# Patient Record
Sex: FEMALE | Race: WHITE
[De-identification: names, ages, dates, MRNs, and addresses within clinical notes are randomized per-mention and may not be internally consistent; named-entity substitution may affect disease eponyms.]

---

## 2018-10-15 NOTE — EKG REPORT
SEVERITY:- OTHERWISE NORMAL ECG -

SINUS RHYTHM

LEFT AXIS DEVIATION

:

Confirmed by: Levy Ly MD 15-Oct-2018 13:31:29

## 2018-10-15 NOTE — RADIOLOGY REPORT (SQ)
EXAM DESCRIPTION:  CHEST PA/LATERAL



COMPLETED DATE/TIME:  10/15/2018 8:57 am



REASON FOR STUDY:  PRE-OP



COMPARISON:  None.



EXAM PARAMETERS:  NUMBER OF VIEWS: two views

TECHNIQUE: Digital Frontal and Lateral radiographic views of the chest acquired.

RADIATION DOSE: NA

LIMITATIONS: none



FINDINGS:  LUNGS AND PLEURA: No opacities, masses or pneumothorax. No pleural effusion.

MEDIASTINUM AND HILAR STRUCTURES: No masses or contour abnormalities.

HEART AND VASCULAR STRUCTURES: Heart normal size.  No evidence for failure.

BONES: No acute findings.

HARDWARE: None in the chest.

OTHER: No other significant finding.



IMPRESSION:  1.  NO SIGNIFICANT RADIOGRAPHIC FINDING IN THE CHEST.



TECHNICAL DOCUMENTATION:  JOB ID:  [id number]

 2011 [employer]- All Rights Reserved



Reading location - IP/workstation name: [email or web address]

## 2018-11-12 NOTE — RADIOLOGY REPORT (SQ)
EXAM DESCRIPTION:  KNEE RIGHT 2 VIEWS



COMPLETED DATE/TIME:  11/12/2018 10:44 am



REASON FOR STUDY:  Post OP -Long Cassette in PACU M17.11  UNILATERAL PRIMARY OSTEOARTHRITIS, RIGHT KN
EE



COMPARISON:  None.



NUMBER OF VIEWS:  Two view(s).



TECHNIQUE:  Digital radiographic images of the right knee post-procedure.



LIMITATIONS:  None.



FINDINGS:  BONES: No  worrisome or unexpected findings post-procedure.

DEVICE: Total knee arthroplasty.

SOFT TISSUES:  No worrisome findings.  Expected postoperative soft tissue changes.



IMPRESSION:  1. SATISFACTORY POSTOPERATIVE RIGHT KNEE.



TECHNICAL DOCUMENTATION:  JOB ID:  [id number]

 2011 [employer]- All Rights Reserved



Reading location - IP/workstation name: [email or web address]

## 2018-11-12 NOTE — OPERATIVE REPORT
Operative Report


DATE OF SURGERY: 11/12/18


PREOPERATIVE DIAGNOSIS: Right knee arthritis


OPERATION: Right knee arthroplasty


SURGEON: TUCKER DOWELL


ANESTHESIA: GA


TISSUE REMOVED OR ALTERED: Bone to pathology with multiple loose bodies


ESTIMATED BLOOD LOSS: 100


PROCEDURE: 





Implants used:


Femur: [place] triathlon size 4 CR femur


Tibia: 4 tibia


Tibial liner: 11 mm CS insert


Patella: A 35 mm oval patella





Procedure with the patient supine on the operating table the right the limb is 

prepped and draped in a sterile fashion.  The limb was elevated for 

exsanguination and the tourniquet inflated to 280 torr.  A standard midline 

median parapatellar approach the knee is taken.  Access is gained to the 

femoral canal through the intercondylar notch.  Intramedullary alignment 

instrumentation used to resect 10 mm of distal femur in 5 of valgus.  Sizing 

guide indicated a size 4 femur.  Appropriate cutting jig is then used to 

fashion anterior posterior and chamfer cuts.  A trial reduction femurs 

performed and this is judged to be adequate.





Attention was next turned to the tibia.  Using an extra medullary alignment 

system 9 millimeters was resected off the lateral tibial plateau.  This is 

sized to a size 4 tibia.  A trial reduction was now performed with a 4 femur 

and a for tibia using a 11 millimeters spacer.  It is full extension and 

central patellofemoral tracking.  The articular surface the patella was next 

resected using an oscillating saw.





All trial implants were removed.  Polymethylmethacrylate is mixed and used to 

cement the above implants in place.  On adequate curing the cement excess 

cement was removed the tourniquet was deflated hemostasis obtained the wound is 

then closed in layers using interrupted Vicryl followed by staples.  A sterile 

compressive dressing was applied and the patient returned to recovery room in 

satisfactory condition.

## 2018-11-13 NOTE — PDOC PROGRESS REPORT
Subjective


Progress Note for:: 11/13/18


Reason For Visit: 


M17.11 UNILATERAL PRIMARY OSTEOARTHRITIS, RIGHT KN


72-year-old white female now postop day 1 status post right knee arthroplasty.  

Patient with ongoing complaints of pain and had some wound drainage leading to 

dressing reinforcement last night.





Physical Exam


Vital Signs: 


 











Temp Pulse Resp BP Pulse Ox


 


 37.4 C   72   16   102/51 L  96 


 


 11/12/18 23:00  11/12/18 23:00  11/12/18 23:00  11/12/18 23:00  11/12/18 23:00








 Intake & Output











 11/12/18 11/13/18 11/14/18





 06:59 06:59 06:59


 


Intake Total 666 4850 


 


Output Total  2700 


 


Balance 666 2150 


 


Weight  189.2 kg 











General appearance: PRESENT: mild distress, obese, well-nourished


Respiratory exam: PRESENT: unlabored


Cardiovascular exam: PRESENT: RRR


Pulses: PRESENT: +1 pedal pulses bilateral


Vascular exam: PRESENT: normal capillary refill


GI/Abdominal exam: PRESENT: soft


Rectal exam: PRESENT: deferred


Extremities exam: PRESENT: other - Right knee dressing change.  Wound is well 

approximated with staples.  There is no active drainage.  OpSite dressing is 

replaced.


Neurological exam: PRESENT: alert, awake, oriented to person, oriented to place

, oriented to time, oriented to situation.  ABSENT: motor sensory deficit


Psychiatric exam: PRESENT: appropriate affect, normal mood.  ABSENT: homicidal 

ideation, suicidal ideation


Skin exam: PRESENT: dry, intact, warm.  ABSENT: cyanosis, rash





Results


Laboratory Results: 


 





 11/13/18 06:15 





 11/13/18 06:15 





 











  11/13/18 11/13/18





  06:15 06:15


 


WBC  8.7 


 


RBC  2.45 L 


 


Hgb  8.0 L 


 


Hct  22.8 L 


 


MCV  93 


 


MCH  32.7 


 


MCHC  35.2 


 


RDW  12.9 


 


Plt Count  158 


 


Sodium   132.6 L


 


Potassium   4.0


 


Chloride   95 L


 


Carbon Dioxide   27


 


Anion Gap   11


 


BUN   24 H


 


Creatinine   0.60


 


Est GFR ( Amer)   > 60


 


Est GFR (Non-Af Amer)   > 60


 


Glucose   115 H


 


Calcium   8.1 L











Impressions: 


 





Knee X-Ray  11/12/18 09:57


IMPRESSION:  1. SATISFACTORY POSTOPERATIVE RIGHT KNEE.


 











Status: Imported from PACS





Assessment & Plan





- Time


Time Spent with patient: 15-24 minutes


Anticipated discharge: Home with Homehealth


Within: Other





- Plan Summary


Plan Summary: 





Patient to be mobilized with physical therapy and weightbearing as tolerated 

basis.  Increase increase analgesics to make the patient more comfortable.

## 2018-11-14 NOTE — PDOC PROGRESS REPORT
Subjective


Progress Note for:: 11/14/18


Reason For Visit: 


M17.11 UNILATERAL PRIMARY OSTEOARTHRITIS, RIGHT KN


72-year-old white female postop day 2 status post right knee arthroplasty.  

Patient again complains of pain.





Physical Exam


Vital Signs: 


 











Temp Pulse Resp BP Pulse Ox


 


 36.8 C   86   16   117/56 L  91 L


 


 11/13/18 20:19  11/13/18 20:19  11/13/18 20:19  11/13/18 20:19  11/13/18 17:09








 Intake & Output











 11/13/18 11/14/18 11/15/18





 06:59 06:59 06:59


 


Intake Total 4850 2073 


 


Output Total 2700  


 


Balance 2150 2073 


 


Weight 189.2 kg 188 kg 











General appearance: PRESENT: mild distress, obese, well-nourished


Respiratory exam: PRESENT: unlabored


Cardiovascular exam: PRESENT: RRR


Pulses: PRESENT: +1 pedal pulses bilateral


Vascular exam: PRESENT: normal capillary refill


GI/Abdominal exam: PRESENT: soft


Rectal exam: PRESENT: deferred


Extremities exam: PRESENT: other - Right knee dressing clean dry and intact


Neurological exam: PRESENT: alert, awake, oriented to person, oriented to place

, oriented to time, oriented to situation.  ABSENT: motor sensory deficit


Psychiatric exam: PRESENT: appropriate affect, normal mood.  ABSENT: homicidal 

ideation, suicidal ideation


Skin exam: PRESENT: dry, intact, warm.  ABSENT: cyanosis, rash





Results


Laboratory Results: 


 





 11/14/18 06:37 





 11/13/18 06:15 





 











  11/13/18 11/14/18





  06:15 06:37


 


WBC   8.4


 


RBC   2.61 L


 


Hgb   8.5 L


 


Hct   24.4 L


 


MCV   94


 


MCH   32.6


 


MCHC   34.9


 


RDW   12.7


 


Plt Count   173


 


Sodium  132.6 L 


 


Potassium  4.0 


 


Chloride  95 L 


 


Carbon Dioxide  27 


 


Anion Gap  11 


 


BUN  24 H 


 


Creatinine  0.60 


 


Est GFR ( Amer)  > 60 


 


Est GFR (Non-Af Amer)  > 60 


 


Glucose  115 H 


 


Calcium  8.1 L 











Impressions: 


 





Knee X-Ray  11/12/18 09:57


IMPRESSION:  1. SATISFACTORY POSTOPERATIVE RIGHT KNEE.


 











Status: Imported from PACS





Assessment & Plan





- Diagnosis


(1) Arthritis of right knee


Is this a current diagnosis for this admission?: Yes   


Plan: 


Continued efforts at physical therapy for mobilization and weightbearing as 

tolerated basis.  Anticipate discharge home tomorrow with home health services 

and DME.








- Time


Time Spent with patient: 15-24 minutes


Anticipated discharge: Home with Homehealth


Within: within 24 hours

## 2018-11-15 NOTE — PDOC TRANSFER SUMMARY
General





- Admit/Disc Date/PCP


Admission Date/Primary Care Provider: 


  11/12/18 06:52





  ANITA LIGHT MD





Discharge Date: 11/15/18





- Discharge Diagnosis


(1) Arthritis of right knee


Is this a current diagnosis for this admission?: Yes   





- Additional Information


Resuscitation Status: Full Code


Home Medications: 








Acetaminophen [Tylenol Extra Strength 500 mg Tablet] 500 mg PO Q8HP PRN 11/12/ 18 


Acetaminophen/Diphenhydramine [Tylenol Pm Ex-Strength Caplet] 1 each PO QHS 11/ 12/18 


Ascorbate Calcium [Vitamin C] 1,000 mg PO DAILY 11/12/18 


Cholecalciferol (Vitamin D3) [Vitamin D3] 2,000 unit PO DAILY 11/12/18 


Ferrous Sulfate [Feosol] 325 mg PO DAILY 11/12/18 


Ibuprofen [Motrin 800 mg Tablet] 800 mg PO TID 11/12/18 


Levothyroxine Sodium [Synthroid] 137 mcg PO Q6AM 11/12/18 


Losartan/Hydrochlorothiazide [Losartan-Hctz 100-25 mg Tab] 1 each PO DAILY 11/12 /18 


Multivit-Min/Iron/Folic/Lutein [Centrum Silver Women Tablet] 1 each PO DAILY 11/ 12/18 


Pregabalin [Lyrica 75 mg Capsule] 75 mg PO Q8 11/12/18 











History of Present Illness


Admission Date/PCP: 


  11/12/18 06:52





  ANITA LIGHT MD





History of Present Illness: 


MCKENNA WILLOUGHBY is a 72 year old female


The patient is a 72-year-old white female with progressive right knee pain and 

functional disability second osteoarthritis.  She is admitted for elective 

right knee arthroplasty.





Hospital Course


Hospital Course: 





Patient is admitted through the operating room where she undergoes 

uncomplicated right knee arthroplasty.  She is returned to floor in 

satisfactory condition.  Overall progress with physical therapy is slow at 

least in part secondary to the patient's discomfort.  On the second 

postoperative day she is noted to be anemic presumably from the operative 

intervention and received 2 units of packed red blood cells.





Physical Exam


Vital Signs: 


 











Temp Pulse Resp BP Pulse Ox


 


 37.2 C   82   12   129/55 H  91 L


 


 11/15/18 11:03  11/15/18 11:03  11/15/18 11:03  11/15/18 11:03  11/15/18 11:03








 Intake & Output











 11/14/18 11/15/18 11/16/18





 06:59 06:59 06:59


 


Intake Total 2073 863 0


 


Output Total  650 


 


Balance 2073 213 0


 


Weight 188 kg 88.5 kg 











General appearance: PRESENT: mild distress, well-nourished


Head exam: PRESENT: normocephalic


Respiratory exam: PRESENT: unlabored


Cardiovascular exam: PRESENT: RRR


Pulses: PRESENT: +1 pedal pulses bilateral


Vascular exam: PRESENT: normal capillary refill


GI/Abdominal exam: PRESENT: soft


Rectal exam: PRESENT: deferred


Extremities exam: PRESENT: other


Neurological exam: PRESENT: alert, awake, oriented to person, oriented to place

, oriented to time, oriented to situation.  ABSENT: motor sensory deficit


Psychiatric exam: PRESENT: appropriate affect, normal mood.  ABSENT: homicidal 

ideation, suicidal ideation


Skin exam: PRESENT: dry, intact, warm.  ABSENT: cyanosis, rash





Results


Laboratory Results: 


 





 11/15/18 05:36 





 11/13/18 06:15 





 











  11/15/18 11/15/18





  05:36 07:45


 


WBC  7.4 


 


RBC  2.45 L 


 


Hgb  8.0 L 


 


Hct  22.5 L 


 


MCV  92 


 


MCH  32.7 


 


MCHC  35.6 


 


RDW  12.5 


 


Plt Count  144 L 


 


Blood Type   A POSITIVE


 


Antibody Screen   NEGATIVE











Impressions: 


 





Knee X-Ray  11/12/18 09:57


IMPRESSION:  1. SATISFACTORY POSTOPERATIVE RIGHT KNEE.


 











Status: Imported from PACS





Transfer Plan





- Time Spent with Patient


Time spent with patient: Less than 30 Minutes





Qualifiers





- *


PATIENT BEING DISCHARGED WITH ANY OF THE FOLLOWING DIAGNOSIS: No


VTE patient discharged on overlapping Therapy?: Yes





Plan


Discharge Plan: 





Patient to be transferred to a skilled nursing facility for ongoing nursing 

care and physical therapy.  Physical therapy for range of motion and 

strengthening of the right lower extremity as well as weightbearing as 

tolerated ambulation.  Follow-up with Dr. Arvizu and [facility] for 

surgery in 2 weeks for staple removal.

## 2020-01-13 NOTE — RADIOLOGY REPORT (SQ)
EXAM DESCRIPTION:  BARIUM SWALLOW ESOPHAGUS



COMPLETED DATE/TIME:  1/13/2020 9:02 am



REASON FOR STUDY:  R13.10 DYSPHAGIA, UNSPECIFIED R13.10  DYSPHAGIA, UNSPECIFIED



COMPARISON:  None.



TECHNIQUE:  Under fluoroscopic guidance, patient ingested effervescent granules followed by thick and
 thin barium. Fluoroscopic spot images and routine radiographic images acquired and stored on PACS.

12 MM BARIUM TABLET GIVEN: Barium tablet passed easily through the esophagus into the stomach without
 delay PE



LIMITATIONS:  None.



FLUOROSCOPY TIME:  FLUORO TIME: 2.19 minutes

9 images saved to PACS.



FINDINGS:  NEUROMUSCULAR COORDINATION OF SWALLOW: Normal. No aspiration.

ESOPHAGEAL MOTILITY: Normal peristalsis. No esophageal spasm.

ESOPHAGEAL MUCOSA: Normal mucosa without masses or ulceration.  Moderate narrowing at the cricopharyn
geus.

GASTRO-ESOPHAGEAL JUNCTION: No hiatal hernia.  Gastroesophageal reflux to the level of the clavicles 
seen.

NON-GI TRACT STRUCTURES: No significant finding.

OTHER: A couple of duodenal diverticuli are identified.



IMPRESSION:  GASTROESOPHAGEAL REFLUX TO THE LEVEL OF THE CLAVICLES.  OTHERWISE UNREMARKABLE STUDY.  .




RECOMMENDATION:  NONE



COMMENT:  NONE

Quality :  Final reports for procedures using fluoroscopy that document radiation exposure rahul
finn, or exposure time and number of fluorographic images (if radiation exposure indices are not avail
able)



TECHNICAL DOCUMENTATION:  JOB ID:  [id number]

 2011 [employer]- All Rights Reserved



Reading location - IP/workstation name: [email or web address]

## 2020-02-06 NOTE — RADIOLOGY REPORT (SQ)
EXAM DESCRIPTION:  ROSA SWALLOW



COMPLETED DATE/TIME:  2/6/2020 9:31 am



REASON FOR STUDY:  R13.10 DYSPHAGIA, UNSPECIFIED R13.10  DYSPHAGIA, UNSPECIFIED painful swallow



COMPARISON:  None.



TECHNIQUE:  Videofluoroscopic swallowing examination was performed in conjunction with speech patholo
gy.  Videofluoroscopic imaging was obtained and reviewed and these are the findings:



RADIATION DOSE:  1 minutes 24 seconds of fluoroscopy was used.

1 images saved to PACS.



LIMITATIONS:  None



FINDINGS:  The patient was brought into the fluoro room and placed upright on a modified barium swall
ow chair.  The patient was then given multiple consistencies mixed with barium to swallow under live 
fluoroscopic video guidance.  According to the Speech Pathologist there was no penetration or aspirat
ion. Moderate cricopharyngeal hypertrophy.



IMPRESSION:  NO EVIDENCE OF PENETRATION OR ASPIRATION. PLEASE SEE SPEECH PATHOLOGIST REPORT FOR OTHER
 FINDINGS AND RECOMMENDATIONS.



COMMENT:  Quality :  Final reports for procedures using fluoroscopy that document radiation exp
osure indices, or exposure time and number of fluorographic images (if radiation exposure indices are
 not available)



TECHNICAL DOCUMENTATION:  JOB ID: [id number]

 2011 [employer]- All Rights Reserved



Reading location - IP/workstation name: [email or web address]

## 2020-02-06 NOTE — ST MODIFIED BARIUM SWALLOW
Recommendation





- Recommendations


Recommendations: No functional swallowing deficits seen at pharyngeal level. 

Patient did demonstrate reduced UES opening, which may be contribuitng to 

swallowing symptoms.  Patient also concerned with voice changes, recommend 

follow up with ENT and possible voice therapy to address concerns.





Medical Diagnoses





- Medical Diagnoses


Medical Diagnosis Description & ICD-10 Code(s): R13.10 dysphagia


Other Medical Diagnoses/Co-Morbidities: per patient report: thyroid dysfunction,

reflux, macular dgeneration, chronic spine pain





ST Modified Barium Swallow





- General


Date: 02/06/20


Referring Physician: Dr. Vann


Risks/Precautions: None


Date of Onset: 05/01/19 - approximate onset date


Reason for Referral: difficulty swallowing





- History


History obtained from: Patient


-: Medical - per patient report: Ms. Liz reports having some pain and trouble 

swallowing for approximately 9 months. she states that she always needs to have 

water or something to drink when eating. she reports that this seemed to start 

with a significant respiratory infection, which has resolved now. Patient also 

reports a lingering cough, and voice changes. She states that she saw an ENT, Dr Katie Sykes, who told her there were no nodules or other structural issues, but 

reported the areawas "inflamed". The patient also had a barium swallow completed

on 1/13/20. Report from this study showed gastroesophageal reflux to the level 

of the clavicles.


Medications: per patient report: thyroid medication, diuretic, blood pressure 

medication, lorsatin, vitamins for eyes, acetaminophen


Allergies: none reported





- Functional Status


Prior Functional Status: INDEPENDENT: feeding - independent


Current Functional Limitations: feeding - difficulty with swallowing solids





- Subjective


Patient/caregiver goal(s): r/o struct. abnormality


Cognitive-Linguistic Function: WNL


Speech Intelligibility: WNL


Current Nutritional Means: PO


Current PO diet: Regular


Current symptoms: Coughing, c/o Globus sensation


Pain: Patient reports, 1/5 - throat pain





- Objective


Assessment: Upright, Left Lateral





- Food Trials Used


Food trials used: Thin liquids, Pureed, Regular


The patient: Was Able to Self Feed





- Oral-Motor Skills


Dentition: Full


Velo-pharyngeal function: Unremarkable


Laryngeal Function: Volitional Cough, Volitional Swallow, hoarse





- Assessment


Oral prep: Normal


Labial closure: Adequate


Leakage: None


Mastication: Adequate


Lingual Movement: Normal


Oral stage: Normal for this Procedure





- Pharyngeal Stage


Initiation of Pharyngeal Stage Reflex: Normal


Decreased laryngeal elevation: No


Reduced Velopharyngeal Closure: no


Reduced pressure generation: No


reduced tongue-based retraction: No


Pre-swallow pooling in valleculae: None


Pre-Swallow pooling in pyriforms: None


Reduced Thyro-Hyoid approximation: No


Reduced epiglottic excursion: No


Reduced pharyngeal peristalsis/contraction: No


Multiple Swallows with: Cleared w/ Liquid Assist


Post-swallow residulas vallecular: Mild


Post-Swallow residuals in pyriforms: Mild


Reduced Cricopharyngeal opening: Yes - Reduced UES opening noted, with some 

residue of solids seen just above and below.





- Fall Risk Assessment


Medications/Conditions that increase fall risks include: Antidepressants, 

sedatives, anti-arrhythmic, diuretic, benzodiazipenes, neuroleptics.  BP 

regulation problems, cardiac problems, balance or gait deficits, neurological 

problems.


Is patient considered at risk for falls: no


Fall Risk Actions Taken: No action needed





- Behavioral Observations


During evaluation process patient: was pleasant, able to answer questions, 

provided medical history





- Treatment / Educational Needs:


Treatment/Education Needs: Treatment consisted of patient education on the role 

of the Speech Pathologist.  Patient's plan of care and golas were communicated 

as well as scheduling and attendance policies.  Recommendations for initial home

program were shared.  Patient demonstrated understanding and verbalized 

agreement.





- Impression/Summary


Laryngeal Penetration: No


Tracheal Aspiration: no


Evaluation and Findings: Patient presents with functional pharyngeal phase 

swallow with all trial textures. It was noted that there was reduced UES 

opening, which resulted in some residue of solids.





- Recommendations


Solid diet recommendations: Regular


Liquid Diet Modification: Thin


Pt/Family education and followup with MD: Yes


Dysphagia therapy with SLP: no


Reflux Precautions: Taught to Patient


Recommended techniques: Fully Upright During Meal, Alternate Bites/Sips


Information, Precautions and Recommendations: Patient (Written), Patient 

(Verbal)


Other recommendations: 


Patient may wish to follow up with ENT due to expressed concerns with voice 

quality. Patient may benefit from voice therapy if physician feels appropriate. 





- Time


Total Time: 40





- Plan of Care


Strategies to optimize patient understanding include:: ongoing assessment of 

educational needs, implementation of educational strategies, and re-education.





- -


-: Thank you for the opportunity to work with this patient and his/her family.  

Should you have any questions about this patient's plan or progress, I can be 

reached at [phone].

## 2020-09-15 ENCOUNTER — HOSPITAL ENCOUNTER (OUTPATIENT)
Dept: HOSPITAL 62 - SC | Age: 75
Discharge: HOME | End: 2020-09-15
Attending: OPHTHALMOLOGY
Payer: MEDICARE

## 2020-09-15 DIAGNOSIS — E03.9: ICD-10-CM

## 2020-09-15 DIAGNOSIS — H25.811: Primary | ICD-10-CM

## 2020-09-15 DIAGNOSIS — Z79.899: ICD-10-CM

## 2020-09-15 PROCEDURE — 66984 XCAPSL CTRC RMVL W/O ECP: CPT

## 2020-09-15 PROCEDURE — V2632 POST CHMBR INTRAOCULAR LENS: HCPCS

## 2020-09-15 PROCEDURE — 00142 ANES PX ON EYE LENS SURGERY: CPT

## 2020-09-15 RX ADMIN — TETRACAINE HYDROCHLORIDE PRN DROP: 5 SOLUTION OPHTHALMIC at 08:54

## 2020-09-15 RX ADMIN — BESIFLOXACIN PRN DROP: 6 SUSPENSION OPHTHALMIC at 00:00

## 2020-09-15 RX ADMIN — CYCLOPENTOLATE HYDROCHLORIDE AND PHENYLEPHRINE HYDROCHLORIDE PRN DROP: 2; 10 SOLUTION/ DROPS OPHTHALMIC at 08:25

## 2020-09-15 RX ADMIN — TROPICAMIDE PRN DROP: 10 SOLUTION/ DROPS OPHTHALMIC at 08:25

## 2020-09-15 RX ADMIN — TROPICAMIDE PRN DROP: 10 SOLUTION/ DROPS OPHTHALMIC at 08:15

## 2020-09-15 RX ADMIN — DORZOLAMIDE HYDROCHLORIDE AND TIMOLOL MALEATE PRN DROP: 20; 5 SOLUTION OPHTHALMIC at 09:21

## 2020-09-15 RX ADMIN — CYCLOPENTOLATE HYDROCHLORIDE AND PHENYLEPHRINE HYDROCHLORIDE PRN DROP: 2; 10 SOLUTION/ DROPS OPHTHALMIC at 08:35

## 2020-09-15 RX ADMIN — BESIFLOXACIN PRN DROP: 6 SUSPENSION OPHTHALMIC at 08:15

## 2020-09-15 RX ADMIN — BESIFLOXACIN PRN DROP: 6 SUSPENSION OPHTHALMIC at 09:21

## 2020-09-15 RX ADMIN — TETRACAINE HYDROCHLORIDE PRN DROP: 5 SOLUTION OPHTHALMIC at 08:15

## 2020-09-15 RX ADMIN — BESIFLOXACIN PRN DROP: 6 SUSPENSION OPHTHALMIC at 08:25

## 2020-09-15 RX ADMIN — PREDNISOLONE ACETATE PRN DROP: 10 SUSPENSION/ DROPS OPHTHALMIC at 09:21

## 2020-09-15 RX ADMIN — TROPICAMIDE PRN DROP: 10 SOLUTION/ DROPS OPHTHALMIC at 08:35

## 2020-09-15 RX ADMIN — TETRACAINE HYDROCHLORIDE PRN DROP: 5 SOLUTION OPHTHALMIC at 08:56

## 2020-09-15 RX ADMIN — CYCLOPENTOLATE HYDROCHLORIDE AND PHENYLEPHRINE HYDROCHLORIDE PRN DROP: 2; 10 SOLUTION/ DROPS OPHTHALMIC at 08:15

## 2020-09-15 RX ADMIN — DORZOLAMIDE HYDROCHLORIDE AND TIMOLOL MALEATE PRN DROP: 20; 5 SOLUTION OPHTHALMIC at 00:00

## 2020-09-15 RX ADMIN — PREDNISOLONE ACETATE PRN DROP: 10 SUSPENSION/ DROPS OPHTHALMIC at 00:00

## 2020-09-15 NOTE — OPERATIVE REPORT
Operative Report-[facility]


Operative Report: 





DATE OF SURGERY: 9/15/2020


PREOPERATIVE DIAGNOSIS: CATARACT, RIGHT EYE.


POSTOPERATIVE DIAGNOSIS: CATARACT, RIGHT EYE.


PROCEDURE PERFORMED:


PHACOEMULSIFICATION WITH POSTERIOR CHAMBER INTRAOCULAR LENS, RIGHT EYE.


Intraocular Lens Model : ZCBOO 21.0


Total Phaco Time: 3.87 CDE


SURGEON: BARRY POTTS MD


ANESTHESIA: TOPICAL WITH MAC.


INDICATIONS FOR SURGERY: Difficulty reading small print.


PROCEDURE:


The patient was brought to the Operating Room and placed on the operative table.

Following tetracaine drops, topical anesthesia was administered. This consisted 

of instrument wipe pledgets soaked in a solution of 4% Xylocaine mixed with 

0.75% Marcaine in a 1:2 ratio. A 2 x 1 cm pledget was placed in the superior 

fornix. A 1 x 1 cm pledget was placed in the inferior fornix. The eye was 

patched shut for 5 minutes. The patch was removed. The eye was sterilely prepped

and draped in the usual manner. Lid speculum was placed in the eye. The pledgets

were removed. 4-0 black silk sutures were placed around the superior and the 

inferior rectus muscles to be used as traction. A conjunctival peritomy was made

at the 10 o'clock position. Hemostasis was obtained with bipolar cautery. A 

posterior limbal groove was created using a crescent knife and dissected 

anteriorly towards the cornea. A sharp point blade was used to create a 

paracentesis site at the 2 o'clock position. 0.2 cc non preserved Lidocaine was 

injected into the anterior chamber. A 2.4 mm keratome was used to enter the 

anterior chamber through the groove. Viscoelastic was injected into the anterior

chamber. An anterior capsulotomy was performed using Utrata forceps in a 

capsulorrhexis fashion. Hydrodissection and hydrodelineation were performed. 

Phacoemulsification was performed in divide-and-conquer technique.


Following this, the I/A unit was used to remove residual cortex. Viscoelastic 

was injected into the capsular bag. The Intraocular lens was placed in the 

capsular bag. The I/A unit was used to remove residual viscoelastic. The wound 

was seen to be watertight under high and low pressure, and no sutures were 

placed. The intraocular lens was well centered. The pressure was adjusted in the

eye to normal pressure. The 4-0 black silk sutures and lid speculum were 

removed. The eye was shielded after Besivance. prednisolone, and Cosopt drops 

were placed. The patient tolerated the procedure well and was sent to the 

Recovery Room in good condition.